# Patient Record
Sex: FEMALE | Race: WHITE | NOT HISPANIC OR LATINO | Employment: UNEMPLOYED | URBAN - NONMETROPOLITAN AREA
[De-identification: names, ages, dates, MRNs, and addresses within clinical notes are randomized per-mention and may not be internally consistent; named-entity substitution may affect disease eponyms.]

---

## 2018-01-31 ENCOUNTER — TRANSCRIBE ORDERS (OUTPATIENT)
Dept: ADMINISTRATIVE | Facility: HOSPITAL | Age: 33
End: 2018-01-31

## 2018-01-31 DIAGNOSIS — N63.0 BREAST NODULE: Primary | ICD-10-CM

## 2018-02-01 ENCOUNTER — HOSPITAL ENCOUNTER (OUTPATIENT)
Dept: ULTRASOUND IMAGING | Facility: HOSPITAL | Age: 33
Discharge: HOME | End: 2018-02-01
Admitting: NURSE PRACTITIONER

## 2018-02-01 ENCOUNTER — HOSPITAL ENCOUNTER (OUTPATIENT)
Dept: ULTRASOUND IMAGING | Facility: HOSPITAL | Age: 33
Discharge: HOME | End: 2018-02-01

## 2018-02-01 DIAGNOSIS — N63.0 BREAST NODULE: ICD-10-CM

## 2018-02-01 PROCEDURE — 76642 ULTRASOUND BREAST LIMITED: CPT

## 2018-02-01 PROCEDURE — 76882 US LMTD JT/FCL EVL NVASC XTR: CPT

## 2020-08-26 ENCOUNTER — APPOINTMENT (OUTPATIENT)
Dept: GENERAL RADIOLOGY | Facility: HOSPITAL | Age: 35
End: 2020-08-26

## 2020-08-26 ENCOUNTER — HOSPITAL ENCOUNTER (EMERGENCY)
Facility: HOSPITAL | Age: 35
Discharge: HOME | End: 2020-08-26
Attending: EMERGENCY MEDICINE | Admitting: EMERGENCY MEDICINE

## 2020-08-26 VITALS
HEART RATE: 80 BPM | OXYGEN SATURATION: 98 % | WEIGHT: 275 LBS | BODY MASS INDEX: 45.82 KG/M2 | RESPIRATION RATE: 18 BRPM | DIASTOLIC BLOOD PRESSURE: 109 MMHG | SYSTOLIC BLOOD PRESSURE: 150 MMHG | HEIGHT: 65 IN

## 2020-08-26 DIAGNOSIS — R11.2 NON-INTRACTABLE VOMITING WITH NAUSEA, UNSPECIFIED VOMITING TYPE: ICD-10-CM

## 2020-08-26 DIAGNOSIS — J40 BRONCHITIS: Primary | ICD-10-CM

## 2020-08-26 PROCEDURE — 71045 X-RAY EXAM CHEST 1 VIEW: CPT

## 2020-08-26 PROCEDURE — 63710000001 ONDANSETRON ODT 4 MG TABLET DISPERSIBLE: Performed by: PHYSICIAN ASSISTANT

## 2020-08-26 PROCEDURE — 99283 EMERGENCY DEPT VISIT LOW MDM: CPT

## 2020-08-26 RX ORDER — ONDANSETRON 4 MG/1
4 TABLET, ORALLY DISINTEGRATING ORAL ONCE
Status: COMPLETED | OUTPATIENT
Start: 2020-08-26 | End: 2020-08-26

## 2020-08-26 RX ORDER — AZITHROMYCIN 250 MG/1
250 TABLET, FILM COATED ORAL DAILY
Qty: 6 TABLET | Refills: 0 | Status: SHIPPED | OUTPATIENT
Start: 2020-08-26

## 2020-08-26 RX ORDER — ONDANSETRON 4 MG/1
4 TABLET, FILM COATED ORAL EVERY 6 HOURS PRN
Qty: 12 TABLET | Refills: 0 | Status: SHIPPED | OUTPATIENT
Start: 2020-08-26

## 2020-08-26 RX ADMIN — ONDANSETRON 4 MG: 4 TABLET, ORALLY DISINTEGRATING ORAL at 17:54

## 2020-11-24 ENCOUNTER — TRANSCRIBE ORDERS (OUTPATIENT)
Dept: ADMINISTRATIVE | Facility: HOSPITAL | Age: 35
End: 2020-11-24

## 2020-11-24 DIAGNOSIS — R10.2 ADNEXAL TENDERNESS, RIGHT: ICD-10-CM

## 2020-11-24 DIAGNOSIS — R22.32 MASS OF FINGER OF LEFT HAND: Primary | ICD-10-CM

## 2020-11-24 DIAGNOSIS — R10.2 FEMALE PELVIC PAIN: ICD-10-CM

## 2020-12-02 ENCOUNTER — HOSPITAL ENCOUNTER (OUTPATIENT)
Dept: ULTRASOUND IMAGING | Facility: HOSPITAL | Age: 35
Discharge: HOME | End: 2020-12-02

## 2020-12-02 DIAGNOSIS — R10.2 FEMALE PELVIC PAIN: ICD-10-CM

## 2020-12-02 DIAGNOSIS — R10.2 ADNEXAL TENDERNESS, RIGHT: ICD-10-CM

## 2020-12-02 PROCEDURE — 76830 TRANSVAGINAL US NON-OB: CPT

## 2020-12-02 PROCEDURE — 76882 US LMTD JT/FCL EVL NVASC XTR: CPT

## 2023-01-06 ENCOUNTER — HOSPITAL ENCOUNTER (EMERGENCY)
Facility: HOSPITAL | Age: 38
Discharge: HOME | End: 2023-01-06
Attending: HOSPITALIST
Payer: MEDICAID

## 2023-01-06 VITALS
OXYGEN SATURATION: 99 % | WEIGHT: 225 LBS | HEART RATE: 76 BPM | TEMPERATURE: 98.6 F | SYSTOLIC BLOOD PRESSURE: 111 MMHG | DIASTOLIC BLOOD PRESSURE: 73 MMHG | BODY MASS INDEX: 36.16 KG/M2 | HEIGHT: 66 IN | RESPIRATION RATE: 18 BRPM

## 2023-01-06 DIAGNOSIS — R11.0 NAUSEA: ICD-10-CM

## 2023-01-06 DIAGNOSIS — R19.7 DIARRHEA, UNSPECIFIED TYPE: Primary | ICD-10-CM

## 2023-01-06 PROCEDURE — 99283 EMERGENCY DEPT VISIT LOW MDM: CPT

## 2023-01-06 RX ORDER — ONDANSETRON 4 MG/1
4 TABLET, FILM COATED ORAL EVERY 8 HOURS PRN
Qty: 10 TABLET | Refills: 0 | Status: SHIPPED | OUTPATIENT
Start: 2023-01-06

## 2023-01-06 ASSESSMENT — LIFESTYLE VARIABLES
HOW MANY STANDARD DRINKS CONTAINING ALCOHOL DO YOU HAVE ON A TYPICAL DAY: PATIENT DOES NOT DRINK
HOW OFTEN DO YOU HAVE A DRINK CONTAINING ALCOHOL: NEVER

## 2023-01-06 ASSESSMENT — PAIN SCALES - GENERAL: PAINLEVEL_OUTOF10: 0

## 2023-01-06 ASSESSMENT — PAIN - FUNCTIONAL ASSESSMENT: PAIN_FUNCTIONAL_ASSESSMENT: 0-10

## 2023-01-06 NOTE — ED PROVIDER NOTES
[facility] ENCOUNTER      Pt Name: Ruel Hicks  MRN: [id number]  YOB: 1985  Date of evaluation: 1/6/2023  Provider: Nupur Frey, [facility]       Chief Complaint   Patient presents with    Nausea    Diarrhea         HISTORY OF PRESENT ILLNESS  (Location/Symptom, Timing/Onset, Context/Setting, Quality, Duration, Modifying Factors, Severity.)   Ruel Hicks is a 40 y.o. female who presents to the emergency department for nausea and diarrhea. Patient states that symptoms started less than 3 hours ago. She woke around 530 and had diarrhea. She states that she has had 5 bouts of it already. She states that she also got nauseated she had this warm sensation sort of went from her head and all the way down she states she felt a little tingly with it also but she did have some episode of some dizziness with the symptoms. She states normally she would need had presented to the emergency department for evaluation of this but with that dizziness she was just a little concerned. Patient states that she is not been around any other sick contacts. Nobody else is sick at home. As she thought it could possibly have been food poisoning initially but she states no one else has gotten sick from what she is eaten over the last several days. She denies any fevers or chills but again she states that she feels like her face is warm at times during this and it will look red to her. She denies any earache or sore throat. Denies any loss of taste or smell. Denies any cough at the ordinary. Denies any chest pain or shortness of breath. Denies any abdominal pain at this time but she states that she was having some mild intermittent little pain to the left lower quadrant that was consistent with her episodes of diarrhea but that is resolved.   Denies any numbness tingling weakness out of ordinary except for that full body sensation went from her head all the way down which was very short-lived and resolved. Denies any body aches out of ordinary. Denies any dysuria or change urinary frequency. She states that she does just feel sort of tired and fatigued      Nursing notes were reviewed. REVIEW OFSYSTEMS    (2-9 systems for level 4, 10 or more for level 5)   ROS:  General:  No fevers, no chills, no weakness, +fatigue  Cardiovascular:  No chest pain, no palpitations  Respiratory:  No shortness of breath, no cough, no wheezing  Gastrointestinal:  +pain-lower quadrant-intermittent resolved at this time, + nausea, no vomiting, + diarrhea  Musculoskeletal:  No muscle pain, no joint pain  Skin:  No rash, no easy bruising  Neurologic:  No speech problems, no headache, no extremity weakness  Psychiatric:  No anxiety  Genitourinary:  No dysuria, no hematuria    Except as noted above the remainder of the review of systems was reviewed and negative. PAST MEDICAL HISTORY     Past Medical History:   Diagnosis Date    Lupus ([address]          SURGICAL HISTORY       Past Surgical History:   Procedure Laterality Date     SECTION      TUBAL LIGATION           CURRENT MEDICATIONS       Previous Medications    PREDNISONE (DELTASONE) 10 MG TABLET    Take 2 tabs twice a day for 3 days, then 1 tab twice a day for 3 days, then one tab daily for 3 days, then stop       ALLERGIES     Patient has no known allergies. FAMILY HISTORY     History reviewed. No pertinent family history.        SOCIAL HISTORY       Social History     Socioeconomic History    Marital status:      Spouse name: None    Number of children: None    Years of education: None    Highest education level: None   Tobacco Use    Smoking status: Former     Packs/day: 1.00     Years: 4.00     Pack years: 4.00     Types: Cigarettes    Smokeless tobacco: Former   Substance and Sexual Activity    Alcohol use: No    Drug use: No         PHYSICAL EXAM    (up to 7 for level 4, 8 or more for level 5) ED Triage Vitals [01/06/23 0823]   BP Temp Temp Source Heart Rate Resp SpO2 Height Weight   115/69 98.6 °F (37 °C) Oral 76 18 100 % 5' 6\" (1.676 m) 225 lb (102.1 kg)       Physical Exam  General :Patient is awake, alert, oriented, in no acute distress, nontoxic appearing  HEENT: Pupils are equally round and reactive to light, EOMI, conjunctivae clear. Oral mucosa is moist, no exudate. Uvula is midline, no erythema to the posterior pharynx. No asymmetrical tissue. Bilateral tympanic membranes canals are normal in appearance  Cardiac: Heart regular rate, rhythm, no murmurs, rubs, or gallops  Lungs: Lungs are clear to auscultation, there is no wheezing, rhonchi, or rales. There is no use of accessory muscles. Abdomen: Abdomen is soft, nontender, nondistended. There is no firm or pulsatile masses, no rebound rigidity or guarding. Musculoskeletal: No focal muscle deficits are appreciated  Neuro: Motor intact, sensory intact, level of consciousness is normal  Dermatology: Skin is warm and dry  Psych: Mentation is grossly normal, cognition is grossly normal. Affect is appropriate. DIAGNOSTIC RESULTS     EKG: All EKG's are interpreted by the Emergency Department Physician who either signs or Co-signs this chart in the [address] a cardiologist.        RADIOLOGY:   Non-plain film images such as CT, Ultrasound and MRI are read by the radiologist. Plain radiographic images are visualized and preliminarily interpreted by the emergency physician with the below findings:      [] Radiologist's Report Reviewed:  No orders to display         ED BEDSIDE ULTRASOUND:   Performed by ED Physician - none    LABS:    I have reviewed and interpreted all of the currently available lab results from this visit (ifapplicable):  No results found for this visit on 01/06/23. All other labs were within normal range or not returned as of this dictation.     EMERGENCY DEPARTMENT COURSE and DIFFERENTIAL DIAGNOSIS/MDM:   Vitals: Vitals:    01/06/23 0823   BP: 115/69   Pulse: 76   Resp: 18   Temp: 98.6 °F (37 °C)   TempSrc: Oral   SpO2: 100%   Weight: 225 lb (102.1 kg)   Height: 5' 6\" (1.676 m)       MEDICATIONS ADMINISTERED IN ED:  Medications - No data to display    After initial evaluation examination I did have conversation with the patient about upcoming plan, treatment possible disposition which they are agreeable to the times dictation. Advised that with her symptoms starting just within the last 3 hours at best to the likelihood of her being dehydrated or having electrolyte imbalance would statistically be very low patient is agreeable to this I do not believe that she will require blood work or IV fluids at this time she is also agreeable to this plan. She also states that she does not have any concern for COVID and does not want to be checked for COVID. After discussion with her she really has not had any fever or cough so she really does not meet the criteria for a flu swab and she has no sore throat so there is no indication for strep testing. She denies any sick contacts that she is aware of. After our conversation worse were both agreeable that I do believe she probably just has some type of viral illness that is causing her to have the nausea and the vomiting she has had the intermittent abdominal pain that is resolved this time. She is resting comfortably stretcher no acute distress nontoxic-appearing. She is not tachycardic she is not hypoxic her blood pressure is running low in the 115 to the 102 area but she is asymptomatic with this. Advised that we would treat her with some nausea medication which she was agreeable to I did discuss with her the use of Pedialyte versus Gatorade or Powerade for fluid resuscitation and she states her understanding of this.   Also advised that we really do not want to stop the diarrhea because she has it for reason but if it gets to the point where she cannot out in the public or is happening every 15 minutes or so then it is okay to take a little something to help slow that down such as over-the-counter antidiarrheal medication but she must be aware that by taking this medication it may flip her from diarrhea to constipation and she does state her understanding of this. Otherwise patient be written a work excuse for today. And be discharged home in stable condition. The patient advised they do need to follow-up with her regular family physician within the next 1 to 2 days for evaluation. They are also advised that if symptoms worsens or new symptoms arise she should return back to emergency department for further evaluation work-up. Is this patient to be included in the SEP-1 Core Measure due to severe sepsis or septic shock? No   Exclusion criteria - the patient is NOT to be included for SEP-1 Core Measure due to:  Viral etiology found or highly suspected (including COVID-19) without concomitant bacterial infection          CONSULTS:  None    PROCEDURES:  Procedures    CRITICAL CARE TIME    Total Critical Care time was 0 minutes, excluding separately reportable procedures. There was a high probability of clinically significant/life threatening deterioration in the patient's condition which required my urgent intervention. FINAL IMPRESSION      1. Diarrhea, unspecified type    2. Nausea          DISPOSITION/PLAN   DISPOSITION Decision To Discharge 01/06/2023 08:52:02 AM      PATIENT REFERRED TO:  Andres Rizzo  [facility]  [phone]    In 2 days      [facility]  [facility]  [address]   [facility]  [phone]    As needed, If symptoms worsen    DISCHARGE MEDICATIONS:  New Prescriptions    ONDANSETRON (ZOFRAN) 4 MG TABLET    Take 1 tablet by mouth every 8 hours as needed for Nausea or Vomiting       Comment: Please note this report has been produced using speech recognition software and may contain errorsrelated to that system including errors in grammar, punctuation, and spelling, as well as words and phrases that may be inappropriate. If there are any questions or concerns please feel free to contact the dictating providerfor clarification.     Genoveva Kwon DO  Attending Emergency Physician               Genoveva Kwon DO  01/06/23 7273

## 2023-01-06 NOTE — ED NOTES
Discharge instructions and medications reviewed with verbalized understanding. Pt had no further questions or concerns.       Rayshawn Eisenberg RN  01/06/23 0256

## 2023-01-06 NOTE — Clinical Note
Altagracia Couch was seen and treated in our emergency department on 1/6/2023. She may return to work on 01/07/2023. If you have any questions or concerns, please don't hesitate to call.       Calvin Dueñas, DO

## 2023-01-06 NOTE — ED TRIAGE NOTES
Patient states that she started having diarrhea this morning. Patient states that she also became sick to her stomach.

## 2023-04-20 ENCOUNTER — TRANSCRIBE ORDERS (OUTPATIENT)
Dept: ADMINISTRATIVE | Facility: HOSPITAL | Age: 38
End: 2023-04-20
Payer: COMMERCIAL

## 2023-04-20 DIAGNOSIS — N92.0 MENORRHAGIA WITH REGULAR CYCLE: Primary | ICD-10-CM

## 2023-05-01 ENCOUNTER — OFFICE VISIT (OUTPATIENT)
Dept: SURGERY | Facility: CLINIC | Age: 38
End: 2023-05-01
Payer: COMMERCIAL

## 2023-05-01 ENCOUNTER — PATIENT ROUNDING (BHMG ONLY) (OUTPATIENT)
Dept: SURGERY | Facility: CLINIC | Age: 38
End: 2023-05-01
Payer: COMMERCIAL

## 2023-05-01 VITALS
OXYGEN SATURATION: 98 % | TEMPERATURE: 98.7 F | RESPIRATION RATE: 18 BRPM | HEIGHT: 65 IN | WEIGHT: 231.8 LBS | HEART RATE: 79 BPM | BODY MASS INDEX: 38.62 KG/M2 | DIASTOLIC BLOOD PRESSURE: 70 MMHG | SYSTOLIC BLOOD PRESSURE: 132 MMHG

## 2023-05-01 DIAGNOSIS — K62.9 ANAL LESION: Primary | ICD-10-CM

## 2023-05-01 PROCEDURE — 1160F RVW MEDS BY RX/DR IN RCRD: CPT | Performed by: STUDENT IN AN ORGANIZED HEALTH CARE EDUCATION/TRAINING PROGRAM

## 2023-05-01 PROCEDURE — 99203 OFFICE O/P NEW LOW 30 MIN: CPT | Performed by: STUDENT IN AN ORGANIZED HEALTH CARE EDUCATION/TRAINING PROGRAM

## 2023-05-01 PROCEDURE — 1159F MED LIST DOCD IN RCRD: CPT | Performed by: STUDENT IN AN ORGANIZED HEALTH CARE EDUCATION/TRAINING PROGRAM

## 2023-05-01 RX ORDER — CEFAZOLIN SODIUM 1 G/3ML
2 INJECTION, POWDER, FOR SOLUTION INTRAMUSCULAR; INTRAVENOUS
OUTPATIENT
Start: 2023-05-01

## 2023-05-01 NOTE — PROGRESS NOTES
May 1, 2023    Hello, may I speak with Allegra Skaggs?    My name is Letty Barnard    I am  with [facility] GENERAL SURGERY [address]  [facility] [address] [phone].    Before we get started may I verify your date of birth? 1985    I am calling to officially welcome you to our practice and ask about your recent visit. Is this a good time to talk? yes    Tell me about your visit with us. What things went well?  visit was good       We're always looking for ways to make our patients' experiences even better. Do you have recommendations on ways we may improve?  no    Overall were you satisfied with your first visit to our practice? yes       I appreciate you taking the time to speak with me today. Is there anything else I can do for you? no      Thank you, and have a great day.

## 2023-05-02 ENCOUNTER — HOSPITAL ENCOUNTER (OUTPATIENT)
Dept: ULTRASOUND IMAGING | Facility: HOSPITAL | Age: 38
Discharge: HOME | End: 2023-05-02
Payer: MEDICAID

## 2023-05-02 DIAGNOSIS — N92.0 MENORRHAGIA WITH REGULAR CYCLE: ICD-10-CM

## 2023-05-02 PROCEDURE — 76830 TRANSVAGINAL US NON-OB: CPT

## 2023-05-08 ENCOUNTER — TELEPHONE (OUTPATIENT)
Dept: SURGERY | Facility: CLINIC | Age: 38
End: 2023-05-08
Payer: COMMERCIAL

## 2023-05-08 NOTE — TELEPHONE ENCOUNTER
Caller: Allegra Skaggs    Relationship: SELF     Best call back number: [phone]    What is the best time to reach you: ANYTIME     Who are you requesting to speak with (clinical staff, provider,  specific staff member): DOESN'T KNOW     Do you know the name of the person who called: NO     What was the call regarding: DOESN'T KNOW     Do you require a callback: YES

## 2023-05-10 NOTE — TELEPHONE ENCOUNTER
Spoke with patient, she confirmed surgery at [facility] on 5/18/23. I advised her that [facility] will call her the day before with a time of arrival. She understood.

## 2023-05-15 ENCOUNTER — TELEPHONE (OUTPATIENT)
Dept: SURGERY | Facility: CLINIC | Age: 38
End: 2023-05-15
Payer: COMMERCIAL

## 2023-05-18 ENCOUNTER — OUTSIDE FACILITY SERVICE (OUTPATIENT)
Dept: SURGERY | Facility: CLINIC | Age: 38
End: 2023-05-18
Payer: COMMERCIAL

## 2023-05-18 ENCOUNTER — DOCUMENTATION (OUTPATIENT)
Dept: SURGERY | Facility: CLINIC | Age: 38
End: 2023-05-18

## 2023-05-18 DIAGNOSIS — K60.2 ANAL FISSURE: Primary | ICD-10-CM

## 2023-05-18 RX ORDER — HYDROCODONE BITARTRATE AND ACETAMINOPHEN 5; 325 MG/1; MG/1
1 TABLET ORAL EVERY 6 HOURS PRN
Qty: 10 TABLET | Refills: 0 | Status: SHIPPED | OUTPATIENT
Start: 2023-05-18

## 2023-05-18 NOTE — PROGRESS NOTES
PATIENT:    Allegra Skaggs    DATE OF SURGERY:       PHYSICIAN:    Huong Sims DO    REFERRING PHYSICIAN:  Nanda Shah APRN    YOB: 1985    PREOPERATIVE DIAGNOSIS:  Anal lesions, concern for fistula in ano    POSTOPERATIVE DIAGNOSIS: Anal fissure with anal skin tags    PROCEDURE:   1) Rectal examination under anesthesia  2) Anal lesion excision x2    EBL:  Less than 50 cc    COMPLICATIONS:  None    SPECIMEN: Anal lesions x2    HISTORY:  The patient presents to me for evaluation and treatment of a history significant for rectal pain and anal lesions that have been present for approximately 2 years.    ANESTHESIA:  MAC    OPERATIVE PROCEDURE:  The patient was seen and examined in the preoperative area. History and physical was reviewed, consent was signed, and all questions were answered. The patient was transferred to the OR and placed on the table in lithotomy position. Anesthesia care team was present to administer sedation and monitor vitals throughout the procedure. Prophylactic antibiotics were administered. Once adequate sedation was attained, the patient was prepped and draped in the usual sterile fashion. Time out was performed.    External rectal examination identified two posterior midline anal lesions/skin tags. A superficial tear in the posterior midline was also identified likely signifying an existing anal fissure. A rectal speculum was inserted into the anus and the anus and rectum were examined circumferentially. No abnormalities or additional masses were identified. The anal lesions were probed to evaluate for fistula in ano, however, no tract was identified nor could any drainage be elicited with palpation. Using a 15 blade scalpel, the two anal lesions were removed and sent to pathology as specimen. Hemostasis was assured using electrocautery. The skin at each excision site was re-approximated with 4-0 chromic suture in simple interrupted fashion. The site was  anesthetized locally with Marcaine. A rolled piece of gel foam with lidocaine jelly was inserted into the rectum.     The patient tolerated the procedure well and was transferred to the PACU in stable condition.    Huong Sims DO

## 2023-05-24 NOTE — PROGRESS NOTES
"Patient: Allegra Skaggs    YOB: 1985    Date: 05/26/2023    Primary Care Provider: Nanda Shah APRN    Chief Complaint   Patient presents with   • Follow-up     Rectal Exam       History of present illness:  I saw the patient in the office today as a followup from their recent anal lesion excision x2 on 5/18/23, the pathology report did show features most consistent with anal fissure with associated inflammation. Negative for specific microorganisms. Negative for dysplasia or malignancy. They state that they have done well and are having no problems.    Vital Signs:  Vitals:    05/26/23 0857   BP: 132/84   Pulse: 75   Resp: 16   Temp: 98.2 °F (36.8 °C)   TempSrc: Temporal   SpO2: 99%   Weight: 105 kg (232 lb 3.2 oz)   Height: 165.1 cm (65\")       Physical Exam:   General Appearance:    Alert, cooperative, in no acute distress, wound clean dry without infection   Abdomen:     no masses, no organomegaly, soft non-tender, non-distended, no guarding, wounds are well healed   Chest:      Clear to ausculation       Assessment / Plan:    1. Anal fissure    2. Anal skin tag      Patient is status post rectal examination under anesthesia, she does have an anal fissure and associated skin tag pathology was benign, but consistent with inflammation from anal fissure. I discussed conservative management with the patient, education provided on chart. I will order her Nifedipine topical ointment and patient will use for 4 weeks. I will see her again after 4 weeks to see if things are healing/improving. We also discussed colonoscopy if fissure does not heal with conservative management to rule out Crohn's or other pathologies. Fissure is in a typical posterior midline location so I have low suspicion for atypical disease. Patient is agreeable to the plan and all questions answered.     Electronically signed by Huong Sims DO  05/26/23                      "

## 2023-05-26 ENCOUNTER — TELEPHONE (OUTPATIENT)
Dept: SURGERY | Facility: CLINIC | Age: 38
End: 2023-05-26
Payer: COMMERCIAL

## 2023-05-26 ENCOUNTER — OFFICE VISIT (OUTPATIENT)
Dept: SURGERY | Facility: CLINIC | Age: 38
End: 2023-05-26
Payer: COMMERCIAL

## 2023-05-26 ENCOUNTER — TELEPHONE (OUTPATIENT)
Dept: SURGERY | Facility: CLINIC | Age: 38
End: 2023-05-26

## 2023-05-26 VITALS
RESPIRATION RATE: 16 BRPM | HEART RATE: 75 BPM | DIASTOLIC BLOOD PRESSURE: 84 MMHG | TEMPERATURE: 98.2 F | BODY MASS INDEX: 38.69 KG/M2 | WEIGHT: 232.2 LBS | HEIGHT: 65 IN | SYSTOLIC BLOOD PRESSURE: 132 MMHG | OXYGEN SATURATION: 99 %

## 2023-05-26 DIAGNOSIS — K64.4 ANAL SKIN TAG: ICD-10-CM

## 2023-05-26 DIAGNOSIS — K60.2 ANAL FISSURE: Primary | ICD-10-CM

## 2023-05-26 PROCEDURE — 99024 POSTOP FOLLOW-UP VISIT: CPT | Performed by: STUDENT IN AN ORGANIZED HEALTH CARE EDUCATION/TRAINING PROGRAM

## 2023-05-26 NOTE — TELEPHONE ENCOUNTER
Per Dr. Sims I called to [facility] Nifedipine (compound) ointment 0.03% sig: apply OK bid x 4 weeks qs X 4 weeks = 30gm tube with one refill.  Patient informed.

## 2023-05-26 NOTE — TELEPHONE ENCOUNTER
[facility] pharmacy returned Dr. Sims's call regarding Nifedipine topical ointment. She stated that [facility] was unable to do compounds. She advised to use [facility] in [place].

## 2023-06-28 ENCOUNTER — TELEPHONE (OUTPATIENT)
Dept: OBSTETRICS AND GYNECOLOGY | Facility: CLINIC | Age: 38
End: 2023-06-28

## 2023-06-28 NOTE — TELEPHONE ENCOUNTER
Caller: Allegra Skaggs    Relationship to patient: Self    Best call back number: [phone]      Type of visit: NEW GYN         If rescheduling, when is the original appointment: 6/28/23      Additional notes:PT R/S TODAYS APPT DUE TO TRANSPORTATION ISSUES SHE DID R/S FOR 8/15/23

## 2023-07-24 ENCOUNTER — TELEPHONE (OUTPATIENT)
Dept: SURGERY | Facility: CLINIC | Age: 38
End: 2023-07-24
Payer: COMMERCIAL

## 2023-07-24 NOTE — TELEPHONE ENCOUNTER
Called to confirm procedure scheduled 7/27/203 at [facility].  No answer/voicemail.

## 2023-07-27 ENCOUNTER — OUTSIDE FACILITY SERVICE (OUTPATIENT)
Dept: SURGERY | Facility: CLINIC | Age: 38
End: 2023-07-27
Payer: COMMERCIAL

## 2024-08-16 ENCOUNTER — HOSPITAL ENCOUNTER (EMERGENCY)
Facility: HOSPITAL | Age: 39
Discharge: HOME | End: 2024-08-16
Attending: EMERGENCY MEDICINE

## 2024-08-16 VITALS
OXYGEN SATURATION: 98 % | DIASTOLIC BLOOD PRESSURE: 91 MMHG | SYSTOLIC BLOOD PRESSURE: 135 MMHG | TEMPERATURE: 98.4 F | WEIGHT: 230 LBS | BODY MASS INDEX: 36.96 KG/M2 | HEIGHT: 66 IN | HEART RATE: 91 BPM | RESPIRATION RATE: 16 BRPM

## 2024-08-16 DIAGNOSIS — L55.9 SUNBURN: Primary | ICD-10-CM

## 2024-08-16 DIAGNOSIS — E86.0 DEHYDRATION: ICD-10-CM

## 2024-08-16 LAB
ANION GAP SERPL CALCULATED.3IONS-SCNC: 14 MMOL/L (ref 3–16)
BASOPHILS # BLD: 0.1 K/UL (ref 0–0.1)
BASOPHILS NFR BLD: 0.4 %
BUN SERPL-MCNC: 13 MG/DL (ref 6–20)
CALCIUM SERPL-MCNC: 9.1 MG/DL (ref 8.5–10.5)
CHLORIDE SERPL-SCNC: 102 MMOL/L (ref 98–107)
CO2 SERPL-SCNC: 20 MMOL/L (ref 20–30)
CREAT SERPL-MCNC: 0.7 MG/DL (ref 0.4–1.2)
EOSINOPHIL # BLD: 0.2 K/UL (ref 0–0.4)
EOSINOPHIL NFR BLD: 1.7 %
ERYTHROCYTE [DISTWIDTH] IN BLOOD BY AUTOMATED COUNT: 12.2 % (ref 11–16)
GFR SERPLBLD CREATININE-BSD FMLA CKD-EPI: >90 ML/MIN/{1.73_M2}
GLUCOSE SERPL-MCNC: 193 MG/DL (ref 74–106)
HCT VFR BLD AUTO: 39.4 % (ref 37–47)
HGB BLD-MCNC: 13.8 G/DL (ref 11.5–16.5)
IMM GRANULOCYTES # BLD: 0.1 K/UL
IMM GRANULOCYTES NFR BLD: 0.4 % (ref 0–5)
LYMPHOCYTES # BLD: 2.3 K/UL (ref 1.5–4)
LYMPHOCYTES NFR BLD: 19.6 %
MAGNESIUM SERPL-MCNC: 2.1 MG/DL (ref 1.7–2.4)
MCH RBC QN AUTO: 31.2 PG (ref 27–32)
MCHC RBC AUTO-ENTMCNC: 35 G/DL (ref 31–35)
MCV RBC AUTO: 88.9 FL (ref 80–100)
MONOCYTES # BLD: 0.8 K/UL (ref 0.2–0.8)
MONOCYTES NFR BLD: 7.1 %
NEUTROPHILS # BLD: 8.3 K/UL (ref 2–7.5)
NEUTS SEG NFR BLD: 70.8 %
PLATELET # BLD AUTO: 315 K/UL (ref 150–400)
PMV BLD AUTO: 8.4 FL (ref 6–10)
POTASSIUM SERPL-SCNC: 3.3 MMOL/L (ref 3.4–5.1)
RBC # BLD AUTO: 4.43 M/UL (ref 3.8–5.8)
SODIUM SERPL-SCNC: 136 MMOL/L (ref 136–145)
WBC # BLD AUTO: 11.8 K/UL (ref 4–11)

## 2024-08-16 PROCEDURE — 85025 COMPLETE CBC W/AUTO DIFF WBC: CPT

## 2024-08-16 PROCEDURE — 83735 ASSAY OF MAGNESIUM: CPT

## 2024-08-16 PROCEDURE — 36415 COLL VENOUS BLD VENIPUNCTURE: CPT

## 2024-08-16 PROCEDURE — 2580000003 HC RX 258: Performed by: EMERGENCY MEDICINE

## 2024-08-16 PROCEDURE — 96360 HYDRATION IV INFUSION INIT: CPT

## 2024-08-16 PROCEDURE — 99284 EMERGENCY DEPT VISIT MOD MDM: CPT

## 2024-08-16 PROCEDURE — 80048 BASIC METABOLIC PNL TOTAL CA: CPT

## 2024-08-16 PROCEDURE — 6370000000 HC RX 637 (ALT 250 FOR IP): Performed by: EMERGENCY MEDICINE

## 2024-08-16 PROCEDURE — 96361 HYDRATE IV INFUSION ADD-ON: CPT

## 2024-08-16 RX ORDER — NAPROXEN 500 MG/1
500 TABLET ORAL 2 TIMES DAILY WITH MEALS
Qty: 60 TABLET | Refills: 0 | Status: SHIPPED | OUTPATIENT
Start: 2024-08-16

## 2024-08-16 RX ORDER — NAPROXEN 500 MG/1
500 TABLET ORAL ONCE
Status: COMPLETED | OUTPATIENT
Start: 2024-08-16 | End: 2024-08-16

## 2024-08-16 RX ORDER — POTASSIUM CHLORIDE 20 MEQ/1
40 TABLET, EXTENDED RELEASE ORAL ONCE
Status: COMPLETED | OUTPATIENT
Start: 2024-08-16 | End: 2024-08-16

## 2024-08-16 RX ORDER — 0.9 % SODIUM CHLORIDE 0.9 %
1000 INTRAVENOUS SOLUTION INTRAVENOUS ONCE
Status: COMPLETED | OUTPATIENT
Start: 2024-08-16 | End: 2024-08-16

## 2024-08-16 RX ADMIN — SODIUM CHLORIDE 1000 ML: 9 INJECTION, SOLUTION INTRAVENOUS at 20:15

## 2024-08-16 RX ADMIN — POTASSIUM CHLORIDE 40 MEQ: 1500 TABLET, EXTENDED RELEASE ORAL at 21:04

## 2024-08-16 RX ADMIN — NAPROXEN 500 MG: 500 TABLET ORAL at 20:21

## 2024-08-16 ASSESSMENT — PAIN SCALES - GENERAL
PAINLEVEL_OUTOF10: 5
PAINLEVEL_OUTOF10: 5

## 2024-08-16 ASSESSMENT — ENCOUNTER SYMPTOMS
COUGH: 0
BACK PAIN: 0
RHINORRHEA: 0
DIARRHEA: 0
EYE PAIN: 0
CONSTIPATION: 0
NAUSEA: 1
ABDOMINAL PAIN: 0
VOMITING: 0
SHORTNESS OF BREATH: 0
EYE REDNESS: 0

## 2024-08-16 ASSESSMENT — LIFESTYLE VARIABLES
HOW OFTEN DO YOU HAVE A DRINK CONTAINING ALCOHOL: NEVER
HOW MANY STANDARD DRINKS CONTAINING ALCOHOL DO YOU HAVE ON A TYPICAL DAY: PATIENT DOES NOT DRINK

## 2024-08-16 ASSESSMENT — PAIN DESCRIPTION - DESCRIPTORS
DESCRIPTORS_2: ACHING
DESCRIPTORS: BURNING

## 2024-08-16 ASSESSMENT — PAIN DESCRIPTION - LOCATION
LOCATION: LEG
LOCATION_2: HEAD

## 2024-08-16 ASSESSMENT — PAIN DESCRIPTION - ORIENTATION: ORIENTATION: POSTERIOR;OTHER (COMMENT)

## 2024-08-16 ASSESSMENT — PAIN DESCRIPTION - INTENSITY: RATING_2: 5

## 2024-08-16 ASSESSMENT — PAIN DESCRIPTION - PAIN TYPE: TYPE: ACUTE PAIN

## 2024-08-16 ASSESSMENT — PAIN DESCRIPTION - ONSET: ONSET: ON-GOING

## 2024-08-16 ASSESSMENT — PAIN DESCRIPTION - FREQUENCY: FREQUENCY: CONTINUOUS

## 2024-08-16 ASSESSMENT — PAIN - FUNCTIONAL ASSESSMENT: PAIN_FUNCTIONAL_ASSESSMENT: 0-10

## 2024-08-17 NOTE — ED PROVIDER NOTES
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 female who presents to the emergency department secondary to concern for a sunburn on her bilateral legs. Reports that yesterday she was in the sun all day pouring concrete outside. Reports that she feels she might have some bleeding today because she feels very weak and her legs are burning severely. She reports taken ibuprofen this morning which did help a little bit, but her symptoms have been persisting.    Past medical history noted below. Aside from what is stated above denies any other symptoms or modifying factors.   reports that she has quit smoking. Her smoking use included cigarettes. She has a 4 pack-year smoking history. She has quit using smokeless tobacco. She reports that she does not drink alcohol and does not use drugs.  Nursing Notes were all reviewed and agreed with or any disagreements addressed in HPI/MDM.  REVIEW OF SYSTEMS :    Review of Systems   Constitutional:  Negative for chills and fever.   HENT:  Negative for congestion and rhinorrhea.    Eyes:  Negative for pain and redness.   Respiratory:  Negative for cough and shortness of breath.    Cardiovascular:  Negative for chest pain and leg swelling.   Gastrointestinal:  Positive for nausea. Negative for abdominal pain, constipation, diarrhea and vomiting.   Genitourinary:  Negative for frequency and urgency.   Musculoskeletal:

## 2024-08-17 NOTE — ED TRIAGE NOTES
hELPING WITH CONCRETE ALL DAY YESTERDAY, BURNED HER LEGS IN THE SUN, WORSE ON THE BACK OF THE LEGS. pT HAS A LINGERING HEADACHE, STOMACH ACHING AND NAUSEA. PT FEELS ONCE SHE GETS FLUIDS SHE'LL FEEL BETTER.

## 2025-01-29 ENCOUNTER — TRANSCRIBE ORDERS (OUTPATIENT)
Dept: ADMINISTRATIVE | Facility: HOSPITAL | Age: 40
End: 2025-01-29
Payer: COMMERCIAL

## 2025-01-29 DIAGNOSIS — M54.12 RADICULOPATHY, CERVICAL REGION: Primary | ICD-10-CM

## 2025-02-24 ENCOUNTER — HOSPITAL ENCOUNTER (OUTPATIENT)
Dept: MRI IMAGING | Facility: HOSPITAL | Age: 40
Discharge: HOME | End: 2025-02-24
Admitting: PHYSICIAN ASSISTANT
Payer: MEDICAID

## 2025-02-24 DIAGNOSIS — M54.12 RADICULOPATHY, CERVICAL REGION: ICD-10-CM

## 2025-02-24 PROCEDURE — 72141 MRI NECK SPINE W/O DYE: CPT

## 2025-03-18 ENCOUNTER — TRANSCRIBE ORDERS (OUTPATIENT)
Dept: ADMINISTRATIVE | Facility: HOSPITAL | Age: 40
End: 2025-03-18
Payer: MEDICAID

## 2025-03-18 DIAGNOSIS — M79.642 LEFT HAND PAIN: Primary | ICD-10-CM

## 2025-04-11 ENCOUNTER — HOSPITAL ENCOUNTER (OUTPATIENT)
Dept: MRI IMAGING | Facility: HOSPITAL | Age: 40
Discharge: HOME | End: 2025-04-11
Payer: MEDICAID

## 2025-04-11 DIAGNOSIS — M79.642 LEFT HAND PAIN: ICD-10-CM

## 2025-04-11 PROCEDURE — 73218 MRI UPPER EXTREMITY W/O DYE: CPT
